# Patient Record
Sex: FEMALE | Race: BLACK OR AFRICAN AMERICAN | Employment: OTHER | URBAN - METROPOLITAN AREA
[De-identification: names, ages, dates, MRNs, and addresses within clinical notes are randomized per-mention and may not be internally consistent; named-entity substitution may affect disease eponyms.]

---

## 2017-04-27 ENCOUNTER — OFFICE VISIT (OUTPATIENT)
Dept: INTERNAL MEDICINE | Age: 82
End: 2017-04-27

## 2017-04-27 VITALS
SYSTOLIC BLOOD PRESSURE: 142 MMHG | OXYGEN SATURATION: 97 % | RESPIRATION RATE: 12 BRPM | DIASTOLIC BLOOD PRESSURE: 76 MMHG | WEIGHT: 167 LBS | HEART RATE: 48 BPM | BODY MASS INDEX: 28.67 KG/M2

## 2017-04-27 DIAGNOSIS — N18.30 CHRONIC KIDNEY DISEASE, STAGE III (MODERATE) (HCC): Chronic | ICD-10-CM

## 2017-04-27 DIAGNOSIS — R00.1 BRADYCARDIA: Chronic | ICD-10-CM

## 2017-04-27 DIAGNOSIS — K62.1 RECTAL POLYP: Chronic | ICD-10-CM

## 2017-04-27 DIAGNOSIS — K63.5 COLON POLYPS: Chronic | ICD-10-CM

## 2017-04-27 DIAGNOSIS — I10 ESSENTIAL HYPERTENSION: Primary | Chronic | ICD-10-CM

## 2017-04-27 PROCEDURE — 99214 OFFICE O/P EST MOD 30 MIN: CPT | Performed by: INTERNAL MEDICINE

## 2017-04-27 PROCEDURE — 4040F PNEUMOC VAC/ADMIN/RCVD: CPT | Performed by: INTERNAL MEDICINE

## 2017-04-27 PROCEDURE — G8427 DOCREV CUR MEDS BY ELIG CLIN: HCPCS | Performed by: INTERNAL MEDICINE

## 2017-04-27 PROCEDURE — G8399 PT W/DXA RESULTS DOCUMENT: HCPCS | Performed by: INTERNAL MEDICINE

## 2017-04-27 PROCEDURE — 1036F TOBACCO NON-USER: CPT | Performed by: INTERNAL MEDICINE

## 2017-04-27 PROCEDURE — 1123F ACP DISCUSS/DSCN MKR DOCD: CPT | Performed by: INTERNAL MEDICINE

## 2017-04-27 PROCEDURE — 1090F PRES/ABSN URINE INCON ASSESS: CPT | Performed by: INTERNAL MEDICINE

## 2017-04-27 PROCEDURE — G8420 CALC BMI NORM PARAMETERS: HCPCS | Performed by: INTERNAL MEDICINE

## 2017-04-27 RX ORDER — AMLODIPINE BESYLATE 5 MG/1
5 TABLET ORAL DAILY
Qty: 90 TABLET | Refills: 3 | Status: SHIPPED | OUTPATIENT
Start: 2017-04-27 | End: 2017-07-18 | Stop reason: SDUPTHER

## 2017-04-27 RX ORDER — TRIAMTERENE AND HYDROCHLOROTHIAZIDE 75; 50 MG/1; MG/1
1 TABLET ORAL DAILY
Qty: 90 TABLET | Refills: 3 | Status: SHIPPED | OUTPATIENT
Start: 2017-04-27 | End: 2017-07-18 | Stop reason: SDUPTHER

## 2017-04-27 ASSESSMENT — ENCOUNTER SYMPTOMS
GASTROINTESTINAL NEGATIVE: 1
RESPIRATORY NEGATIVE: 1
EYES NEGATIVE: 1

## 2019-09-02 ENCOUNTER — APPOINTMENT (OUTPATIENT)
Dept: GENERAL RADIOLOGY | Age: 84
End: 2019-09-02
Payer: MEDICARE

## 2019-09-02 ENCOUNTER — HOSPITAL ENCOUNTER (EMERGENCY)
Age: 84
Discharge: HOME | End: 2019-09-03
Attending: EMERGENCY MEDICINE
Payer: MEDICARE

## 2019-09-02 VITALS
HEART RATE: 85 BPM | SYSTOLIC BLOOD PRESSURE: 122 MMHG | OXYGEN SATURATION: 98 % | TEMPERATURE: 97.5 F | WEIGHT: 167 LBS | BODY MASS INDEX: 28.67 KG/M2 | RESPIRATION RATE: 16 BRPM | DIASTOLIC BLOOD PRESSURE: 53 MMHG

## 2019-09-02 DIAGNOSIS — T85.528A DISLODGED GASTROSTOMY TUBE: Primary | ICD-10-CM

## 2019-09-02 PROCEDURE — 99284 EMERGENCY DEPT VISIT MOD MDM: CPT

## 2019-09-02 PROCEDURE — 4500000024 HC ED LEVEL 4 PROCEDURE

## 2019-09-02 PROCEDURE — 74018 RADEX ABDOMEN 1 VIEW: CPT

## 2019-09-03 NOTE — ED NOTES
LPN from [facility] called requesting paperwork.  ED Encounter printed and faxed to [phone]     Francisco Marroquin RN  09/03/19 0006

## 2019-09-03 NOTE — ED PROVIDER NOTES
her father; Heart Attack in her brother and sister; Heart Defect in her sister; Heart Disease in her brother and sister; High Blood Pressure in her daughter and sister; Hypertension in her sister; No Known Problems in her son; Other in her brother, daughter, and sister; Stroke in her father. She reports that she has quit smoking. She has never used smokeless tobacco. She reports that she does not drink alcohol or use drugs. Medications     Discharge Medication List as of 9/3/2019 12:16 AM      CONTINUE these medications which have NOT CHANGED    Details   triamterene-hydrochlorothiazide (MAXZIDE) 75-50 MG per tablet Take 1 tablet by mouth daily, Disp-90 tablet, R-3Normal      amLODIPine (NORVASC) 5 MG tablet Take 1 tablet by mouth daily, Disp-90 tablet, R-3Normal      aspirin EC 81 MG EC tablet Take 1 tablet by mouth daily. With food. vitamin D (CHOLECALCIFEROL) 1000 UNIT TABS tablet Take 1 tablet by mouth daily. vitamin B-12 (CYANOCOBALAMIN) 500 MCG tablet Take 1 tablet by mouth daily. Ascorbic Acid (VITAMIN C) 500 MG tablet Take 1 tablet by mouth daily. calcium carbonate (OSCAL) 500 MG TABS tablet Take 1 tablet by mouth daily. With food. loratadine (CLARITIN) 10 MG tablet Take 1 tablet by mouth daily. , Disp-90 tablet, R-3      Multiple Vitamin (MULTIVITAMIN PO) Take 1 tablet by mouth daily. Allergies     She has No Known Allergies. Physical Exam     INITIAL VITALS: BP: (!) 122/53, Temp: 97.5 °F (36.4 °C), Pulse: 85, Resp: 16, SpO2: 98 %     Physical exam  General: well-appearing, no acute distress. HEENT: no discharge from the eyes or nose   Cardiovascular: regular rate and rhythm. Pulmonary: non-labored breathing, normal lung sounds  Abdominal: soft, non-tender, non-distended. There is an ostomy site in the LUQ with mild surrounding erythema and local trauma without evidence of active bleeding.    Musculoskeletal: no long bone deformity  Extremities: no peripheral

## 2022-02-15 ENCOUNTER — ANESTHESIA EVENT (OUTPATIENT)
Dept: ENDOSCOPY | Age: 87
End: 2022-02-15
Payer: MEDICARE

## 2022-02-15 ENCOUNTER — ANESTHESIA (OUTPATIENT)
Dept: ENDOSCOPY | Age: 87
End: 2022-02-15
Payer: MEDICARE

## 2022-02-15 ENCOUNTER — HOSPITAL ENCOUNTER (OUTPATIENT)
Age: 87
Setting detail: OBSERVATION
Discharge: NURSING, REHAB OR LONG-TERM CARE | End: 2022-02-16
Attending: EMERGENCY MEDICINE | Admitting: INTERNAL MEDICINE
Payer: MEDICARE

## 2022-02-15 VITALS — DIASTOLIC BLOOD PRESSURE: 59 MMHG | OXYGEN SATURATION: 99 % | SYSTOLIC BLOOD PRESSURE: 120 MMHG

## 2022-02-15 DIAGNOSIS — I10 ESSENTIAL HYPERTENSION: Chronic | ICD-10-CM

## 2022-02-15 DIAGNOSIS — I10 HYPERTENSION: Chronic | ICD-10-CM

## 2022-02-15 DIAGNOSIS — T85.528A GASTROJEJUNOSTOMY TUBE DISLODGEMENT: Primary | ICD-10-CM

## 2022-02-15 PROBLEM — K94.23 GASTROSTOMY MALFUNCTION (HCC): Status: ACTIVE | Noted: 2022-02-15

## 2022-02-15 LAB
ANION GAP SERPL CALCULATED.3IONS-SCNC: 8 MMOL/L (ref 3–16)
BASOPHILS ABSOLUTE: 0 K/UL (ref 0–0.2)
BASOPHILS RELATIVE PERCENT: 0.9 %
BUN BLDV-MCNC: 13 MG/DL (ref 7–20)
CALCIUM SERPL-MCNC: 10.1 MG/DL (ref 8.3–10.6)
CHLORIDE BLD-SCNC: 105 MMOL/L (ref 99–110)
CO2: 30 MMOL/L (ref 21–32)
CREAT SERPL-MCNC: 0.6 MG/DL (ref 0.6–1.2)
EOSINOPHILS ABSOLUTE: 0.2 K/UL (ref 0–0.6)
EOSINOPHILS RELATIVE PERCENT: 4.2 %
GFR AFRICAN AMERICAN: >60
GFR NON-AFRICAN AMERICAN: >60
GLUCOSE BLD-MCNC: 117 MG/DL (ref 70–99)
GLUCOSE BLD-MCNC: 80 MG/DL (ref 70–99)
GLUCOSE BLD-MCNC: 87 MG/DL (ref 70–99)
HCT VFR BLD CALC: 40.1 % (ref 36–48)
HEMOGLOBIN: 13.2 G/DL (ref 12–16)
LYMPHOCYTES ABSOLUTE: 1.6 K/UL (ref 1–5.1)
LYMPHOCYTES RELATIVE PERCENT: 29.6 %
MCH RBC QN AUTO: 31.1 PG (ref 26–34)
MCHC RBC AUTO-ENTMCNC: 33 G/DL (ref 31–36)
MCV RBC AUTO: 94.2 FL (ref 80–100)
MONOCYTES ABSOLUTE: 0.4 K/UL (ref 0–1.3)
MONOCYTES RELATIVE PERCENT: 7.5 %
NEUTROPHILS ABSOLUTE: 3 K/UL (ref 1.7–7.7)
NEUTROPHILS RELATIVE PERCENT: 57.8 %
PDW BLD-RTO: 14.5 % (ref 12.4–15.4)
PERFORMED ON: ABNORMAL
PERFORMED ON: NORMAL
PLATELET # BLD: 206 K/UL (ref 135–450)
PMV BLD AUTO: 11 FL (ref 5–10.5)
POTASSIUM REFLEX MAGNESIUM: 4.3 MMOL/L (ref 3.5–5.1)
RBC # BLD: 4.26 M/UL (ref 4–5.2)
SARS-COV-2, NAAT: NOT DETECTED
SODIUM BLD-SCNC: 143 MMOL/L (ref 136–145)
WBC # BLD: 5.3 K/UL (ref 4–11)

## 2022-02-15 PROCEDURE — 6360000002 HC RX W HCPCS: Performed by: NURSE ANESTHETIST, CERTIFIED REGISTERED

## 2022-02-15 PROCEDURE — 2709999900 HC NON-CHARGEABLE SUPPLY: Performed by: INTERNAL MEDICINE

## 2022-02-15 PROCEDURE — 80048 BASIC METABOLIC PNL TOTAL CA: CPT

## 2022-02-15 PROCEDURE — 7100000001 HC PACU RECOVERY - ADDTL 15 MIN: Performed by: INTERNAL MEDICINE

## 2022-02-15 PROCEDURE — 2500000003 HC RX 250 WO HCPCS: Performed by: NURSE ANESTHETIST, CERTIFIED REGISTERED

## 2022-02-15 PROCEDURE — 2580000003 HC RX 258: Performed by: INTERNAL MEDICINE

## 2022-02-15 PROCEDURE — 87635 SARS-COV-2 COVID-19 AMP PRB: CPT

## 2022-02-15 PROCEDURE — 3609013300 HC EGD TUBE PLACEMENT: Performed by: INTERNAL MEDICINE

## 2022-02-15 PROCEDURE — G0378 HOSPITAL OBSERVATION PER HR: HCPCS

## 2022-02-15 PROCEDURE — 3700000001 HC ADD 15 MINUTES (ANESTHESIA): Performed by: INTERNAL MEDICINE

## 2022-02-15 PROCEDURE — 85025 COMPLETE CBC W/AUTO DIFF WBC: CPT

## 2022-02-15 PROCEDURE — 3700000000 HC ANESTHESIA ATTENDED CARE: Performed by: INTERNAL MEDICINE

## 2022-02-15 PROCEDURE — 99285 EMERGENCY DEPT VISIT HI MDM: CPT

## 2022-02-15 PROCEDURE — 2580000003 HC RX 258: Performed by: PHYSICIAN ASSISTANT

## 2022-02-15 PROCEDURE — 7100000000 HC PACU RECOVERY - FIRST 15 MIN: Performed by: INTERNAL MEDICINE

## 2022-02-15 RX ORDER — DEXTROSE MONOHYDRATE 25 G/50ML
12.5 INJECTION, SOLUTION INTRAVENOUS ONCE
Status: CANCELLED | OUTPATIENT
Start: 2022-02-15

## 2022-02-15 RX ORDER — SODIUM CHLORIDE 9 MG/ML
25 INJECTION, SOLUTION INTRAVENOUS PRN
Status: DISCONTINUED | OUTPATIENT
Start: 2022-02-15 | End: 2022-02-16 | Stop reason: HOSPADM

## 2022-02-15 RX ORDER — ONDANSETRON 8 MG/1
4 TABLET, ORALLY DISINTEGRATING ORAL EVERY 6 HOURS PRN
COMMUNITY

## 2022-02-15 RX ORDER — GLYCOPYRROLATE 1 MG/5 ML
SYRINGE (ML) INTRAVENOUS PRN
Status: DISCONTINUED | OUTPATIENT
Start: 2022-02-15 | End: 2022-02-15 | Stop reason: SDUPTHER

## 2022-02-15 RX ORDER — PROPOFOL 10 MG/ML
INJECTION, EMULSION INTRAVENOUS CONTINUOUS PRN
Status: DISCONTINUED | OUTPATIENT
Start: 2022-02-15 | End: 2022-02-15 | Stop reason: SDUPTHER

## 2022-02-15 RX ORDER — ACETAMINOPHEN 325 MG/1
650 TABLET ORAL EVERY 4 HOURS PRN
Status: DISCONTINUED | OUTPATIENT
Start: 2022-02-15 | End: 2022-02-16 | Stop reason: HOSPADM

## 2022-02-15 RX ORDER — SODIUM CHLORIDE 0.9 % (FLUSH) 0.9 %
5-40 SYRINGE (ML) INJECTION PRN
Status: DISCONTINUED | OUTPATIENT
Start: 2022-02-15 | End: 2022-02-16 | Stop reason: HOSPADM

## 2022-02-15 RX ORDER — LORAZEPAM 2 MG/ML
1 INJECTION INTRAMUSCULAR EVERY 6 HOURS PRN
Status: ON HOLD | COMMUNITY
End: 2022-02-16 | Stop reason: HOSPADM

## 2022-02-15 RX ORDER — MORPHINE SULFATE 5 MG/ML
INJECTION, SOLUTION INTRAMUSCULAR; INTRAVENOUS
Status: ON HOLD | COMMUNITY
End: 2022-02-16 | Stop reason: HOSPADM

## 2022-02-15 RX ORDER — ASPIRIN 81 MG/1
81 TABLET ORAL DAILY
Status: DISCONTINUED | OUTPATIENT
Start: 2022-02-15 | End: 2022-02-15

## 2022-02-15 RX ORDER — CALCIUM CARBONATE 500(1250)
500 TABLET ORAL DAILY
Status: DISCONTINUED | OUTPATIENT
Start: 2022-02-15 | End: 2022-02-15

## 2022-02-15 RX ORDER — LANOLIN ALCOHOL/MO/W.PET/CERES
500 CREAM (GRAM) TOPICAL DAILY
Status: DISCONTINUED | OUTPATIENT
Start: 2022-02-15 | End: 2022-02-15

## 2022-02-15 RX ORDER — VITAMIN B COMPLEX
1000 TABLET ORAL DAILY
Status: DISCONTINUED | OUTPATIENT
Start: 2022-02-15 | End: 2022-02-15

## 2022-02-15 RX ORDER — MORPHINE SULFATE 100 MG/5ML
10 SOLUTION ORAL
Status: ON HOLD | COMMUNITY
End: 2022-02-16 | Stop reason: HOSPADM

## 2022-02-15 RX ORDER — MULTIVIT-MIN/FERROUS GLUCONATE 9 MG/15 ML
15 LIQUID (ML) ORAL DAILY
Status: DISCONTINUED | OUTPATIENT
Start: 2022-02-15 | End: 2022-02-15

## 2022-02-15 RX ORDER — MULTIVITAMIN WITH IRON
1 TABLET ORAL DAILY
Status: DISCONTINUED | OUTPATIENT
Start: 2022-02-15 | End: 2022-02-15 | Stop reason: SDUPTHER

## 2022-02-15 RX ORDER — TRIAMTERENE AND HYDROCHLOROTHIAZIDE 75; 50 MG/1; MG/1
1 TABLET ORAL DAILY
Status: DISCONTINUED | OUTPATIENT
Start: 2022-02-15 | End: 2022-02-15

## 2022-02-15 RX ORDER — POTASSIUM CHLORIDE 20 MEQ/1
20 TABLET, EXTENDED RELEASE ORAL DAILY
Status: ON HOLD | COMMUNITY
End: 2022-02-16 | Stop reason: HOSPADM

## 2022-02-15 RX ORDER — MORPHINE SULFATE 100 MG/5ML
10 SOLUTION ORAL EVERY 8 HOURS
Status: ON HOLD | COMMUNITY
End: 2022-02-16 | Stop reason: HOSPADM

## 2022-02-15 RX ORDER — PROPOFOL 10 MG/ML
INJECTION, EMULSION INTRAVENOUS PRN
Status: DISCONTINUED | OUTPATIENT
Start: 2022-02-15 | End: 2022-02-15 | Stop reason: SDUPTHER

## 2022-02-15 RX ORDER — PHENYLEPHRINE HCL IN 0.9% NACL 1 MG/10 ML
SYRINGE (ML) INTRAVENOUS PRN
Status: DISCONTINUED | OUTPATIENT
Start: 2022-02-15 | End: 2022-02-15 | Stop reason: SDUPTHER

## 2022-02-15 RX ORDER — ASCORBIC ACID 500 MG
500 TABLET ORAL DAILY
Status: DISCONTINUED | OUTPATIENT
Start: 2022-02-15 | End: 2022-02-15

## 2022-02-15 RX ORDER — SODIUM CHLORIDE 0.9 % (FLUSH) 0.9 %
5-40 SYRINGE (ML) INJECTION EVERY 12 HOURS SCHEDULED
Status: DISCONTINUED | OUTPATIENT
Start: 2022-02-15 | End: 2022-02-16 | Stop reason: HOSPADM

## 2022-02-15 RX ORDER — CETIRIZINE HYDROCHLORIDE 10 MG/1
5 TABLET ORAL DAILY
Refills: 3 | Status: DISCONTINUED | OUTPATIENT
Start: 2022-02-15 | End: 2022-02-15

## 2022-02-15 RX ORDER — LEVETIRACETAM 100 MG/ML
10 SOLUTION ORAL 2 TIMES DAILY
COMMUNITY

## 2022-02-15 RX ORDER — CEFAZOLIN SODIUM 1 G/3ML
INJECTION, POWDER, FOR SOLUTION INTRAMUSCULAR; INTRAVENOUS PRN
Status: DISCONTINUED | OUTPATIENT
Start: 2022-02-15 | End: 2022-02-15 | Stop reason: SDUPTHER

## 2022-02-15 RX ORDER — LANSOPRAZOLE 30 MG/1
30 TABLET, ORALLY DISINTEGRATING, DELAYED RELEASE ORAL
Status: DISCONTINUED | OUTPATIENT
Start: 2022-02-16 | End: 2022-02-16 | Stop reason: HOSPADM

## 2022-02-15 RX ADMIN — SODIUM CHLORIDE: 9 INJECTION, SOLUTION INTRAVENOUS at 13:03

## 2022-02-15 RX ADMIN — Medication 0.2 MG: at 13:31

## 2022-02-15 RX ADMIN — Medication 200 MCG: at 13:30

## 2022-02-15 RX ADMIN — PROPOFOL 40 MG: 10 INJECTION, EMULSION INTRAVENOUS at 13:11

## 2022-02-15 RX ADMIN — PROPOFOL 30 MG: 10 INJECTION, EMULSION INTRAVENOUS at 13:08

## 2022-02-15 RX ADMIN — Medication 100 MCG: at 13:15

## 2022-02-15 RX ADMIN — PROPOFOL 30 MG: 10 INJECTION, EMULSION INTRAVENOUS at 13:07

## 2022-02-15 RX ADMIN — SODIUM CHLORIDE, PRESERVATIVE FREE 10 ML: 5 INJECTION INTRAVENOUS at 21:00

## 2022-02-15 RX ADMIN — Medication 100 MCG: at 13:22

## 2022-02-15 RX ADMIN — PROPOFOL 30 MG: 10 INJECTION, EMULSION INTRAVENOUS at 13:09

## 2022-02-15 RX ADMIN — PROPOFOL 100 MCG/KG/MIN: 10 INJECTION, EMULSION INTRAVENOUS at 13:07

## 2022-02-15 RX ADMIN — CEFAZOLIN 2000 MG: 1 INJECTION, POWDER, FOR SOLUTION INTRAVENOUS at 13:07

## 2022-02-15 RX ADMIN — Medication 0.2 MG: at 13:16

## 2022-02-15 ASSESSMENT — PULMONARY FUNCTION TESTS
PIF_VALUE: 1

## 2022-02-15 NOTE — ED NOTES
Priya from  called and stated she spoke with Yesenia too and got consent,  Told to send antibiotics with patient     Nena Ospina, NEMO  02/15/22 8581

## 2022-02-15 NOTE — PROGRESS NOTES
Pt arrived from endo to PACU bay 4. Report received from endo staff. Pt asleep, non-responsive at time of arrival; pt hypotensive at time of arrival, CRNA aware; Pt arrives on 4L oxygen via nasal cannula. Assisted endo RN to place abdominal binder on pt. Surgical incisions dressings in place to abdomen, pt PEG tube replaced. Pt to be admitted following procedure.

## 2022-02-15 NOTE — ED TRIAGE NOTES
Pt BIB EMS from NH, per report Pt pulled g-tube. No other distress noted. Pt non-verbal, hemiplegic at baseline. No bleeding/drainage noted from g-tube insertion site.

## 2022-02-15 NOTE — PROGRESS NOTES
Pt stable and able to be transferred from PACU to room 476. A&O , VSS, with no complaints at this time. 4T called and notified that pt is being transferred out of PACU and to room.

## 2022-02-15 NOTE — ANESTHESIA PRE PROCEDURE
Department of Anesthesiology  Preprocedure Note       Name:  Zulma Hewitt   Age:  80 y.o.  :  1935                                          MRN:  [id number]         Date:  2/15/2022      Surgeon: Loreto Rodriguez):  Sabas Gonzalez MD    Procedure: Procedure(s):  EGD PEG TUBE PLACEMENT    Medications prior to admission:   Prior to Admission medications    Medication Sig Start Date End Date Taking? Authorizing Provider   triamterene-hydrochlorothiazide (MAXZIDE) 75-50 MG per tablet Take 1 tablet by mouth daily 17   Amina Millan MD   amLODIPine [facility]) 5 MG tablet Take 1 tablet by mouth daily 17   Amina Millan MD   aspirin EC 81 MG EC tablet Take 1 tablet by mouth daily. With food. 4/15/14   Amina Millan MD   vitamin D (CHOLECALCIFEROL) 1000 UNIT TABS tablet Take 1 tablet by mouth daily. 4/15/14   Amina Millan MD   vitamin B-12 (CYANOCOBALAMIN) 500 MCG tablet Take 1 tablet by mouth daily. 13   Amina Millan MD   Ascorbic Acid (VITAMIN C) 500 MG tablet Take 1 tablet by mouth daily. 13   Amina Millan MD   calcium carbonate (OSCAL) 500 MG TABS tablet Take 1 tablet by mouth daily. With food. 13   Amina Millan MD   loratadine (CLARITIN) 10 MG tablet Take 1 tablet by mouth daily. 13   Amina Millan MD   Multiple Vitamin (MULTIVITAMIN PO) Take 1 tablet by mouth daily.     Historical Provider, MD       Current medications:    Current Facility-Administered Medications   Medication Dose Route Frequency Provider Last Rate Last Admin    sodium chloride flush 0.9 % injection 5-40 mL  5-40 mL IntraVENous 2 times per day Edward Thayer PA-C        sodium chloride flush 0.9 % injection 5-40 mL  5-40 mL IntraVENous PRN Edward Thayer PA-C        0.9 % sodium chloride infusion  25 mL IntraVENous PRN Edward Thayer PA-C        ceFAZolin (ANCEF) 2000 mg in dextrose 5 % 50 mL IVPB  2,000 mg IntraVENous [facility]        ascorbic acid (VITAMIN C) tablet 500 mg  500 mg Oral Daily Daquan Daly MD        aspirin EC tablet 81 mg  81 mg Oral Daily Daquan Daly MD        calcium carbonate (OSCAL) tablet 500 mg  500 mg Oral Daily Daquan Daly MD        cetirizine (ZYRTEC) tablet 5 mg  5 mg Oral Daily Daquan Daly MD        Multivitamin LIQD 5 mL  5 mL Oral Daily Daquan Daly MD        triamterene-hydroCHLOROthiazide (MAXZIDE) 75-50 MG per tablet 1 tablet  1 tablet Oral Daily Daquan Daly MD        vitamin B-12 (CYANOCOBALAMIN) tablet 500 mcg  500 mcg Oral Daily Daquan Daly MD        vitamin D (CHOLECALCIFEROL) tablet 1,000 Units  1,000 Units Oral Daily Daquan Daly MD        acetaminophen (TYLENOL) tablet 650 mg  650 mg Oral Q4H PRN Daquan Daly MD           Allergies:  No Known Allergies    Problem List:    Patient Active Problem List   Diagnosis Code    Restless legs syndrome (RLS) G25.81    Menopause syndrome N95.1    Chronic kidney disease, stage III (moderate) (HCC) N18.30    Allergic rhinitis J30.9    Hemorrhoids K64.9    Osteopenia M85.80    Cataract H26.9    Rectal polyp K62.1    Diverticulosis K57.90    Fibrocystic breast disease N60.19    Osteoporosis M81.0    Bradycardia R00.1    Shingles B02.9    Insomnia G47.00    Peripheral neuropathy G62.9    Anxiety F41.9    Heme positive stool R19.5    Colon polyps K63.5    Essential hypertension I10    Primary osteoarthritis M19.91    Gastrostomy malfunction ([address] K94.23       Past Medical History:        Diagnosis Date    Allergic rhinitis     Anxiety 9/23/2013    Arthritis     Bradycardia 9/12/2011    Cataract     Chronic kidney disease, stage III (moderate)     Colon polyps 10/8/2015    Diverticulosis     Essential hypertension 10/8/2015    Fibrocystic breast disease     Hemorrhoids     Hypertension     Insomnia 9/23/2013    Menopause syndrome     Osteoarthritis     Osteopenia     Osteoporosis 9/12/2011    Other nonspecific finding on examination of urine     [facility] 9/23/2013    Primary osteoarthritis 10/8/2015    Rectal polyp     Restless legs syndrome (RLS)     Shingles 3/12/2012       Past Surgical History:        Procedure Laterality Date    COLONOSCOPY      COLONOSCOPY  May 5, 2015    Dr. Ligia Vásquez  01/10/2017    Dr. Sacha Greene       Social History:    Social History     Tobacco Use    Smoking status: Former Smoker    Smokeless tobacco: Never Used    Tobacco comment: quit in the 1970s   Substance Use Topics    Alcohol use:  No                                Counseling given: Not Answered  Comment: quit in the 1970s      Vital Signs (Current):   Vitals:    02/15/22 0801 02/15/22 0948 02/15/22 1018 02/15/22 1103   BP: 121/84 (!) 172/120 (!) 168/90 (!) 148/105   Pulse: 59 64 79 83   Resp: 11 12 14 11   Temp:       TempSrc:       SpO2: 96% 98% 97% 98%                                              BP Readings from Last 3 Encounters:   02/15/22 (!) 148/105   09/02/19 (!) 122/53   04/27/17 (!) 142/76       NPO Status:                                                                                 BMI:   Wt Readings from Last 3 Encounters:   09/02/19 167 lb (75.8 kg)   04/27/17 167 lb (75.8 kg)   10/27/16 168 lb (76.2 kg)     There is no height or weight on file to calculate BMI.    CBC:   Lab Results   Component Value Date    WBC 5.3 02/15/2022    RBC 4.26 02/15/2022    HGB 13.2 02/15/2022    HCT 40.1 02/15/2022    MCV 94.2 02/15/2022    RDW 14.5 02/15/2022     02/15/2022       CMP:   Lab Results   Component Value Date     02/15/2022    K 4.3 02/15/2022     02/15/2022    CO2 30 02/15/2022    BUN 13 02/15/2022    CREATININE 0.6 02/15/2022    GFRAA >60 02/15/2022    GFRAA >60 09/19/2012    LABGLOM >60 02/15/2022    GLUCOSE 87 02/15/2022    GLUCOSE 87 09/12/2011    PROT 7.3 10/27/2016    PROT 7.1 09/19/2012    CALCIUM 10.1 02/15/2022    BILITOT 0.4 10/27/2016    ALKPHOS 71 10/27/2016    AST 18 10/27/2016    ALT 14 10/27/2016       POC Tests: No results for input(s): POCGLU, POCNA, POCK, POCCL, POCBUN, POCHEMO, POCHCT in the last 72 hours. Coags: No results found for: PROTIME, INR, APTT    HCG (If Applicable): No results found for: PREGTESTUR, PREGSERUM, HCG, HCGQUANT     ABGs: No results found for: PHART, PO2ART, ZYS0MTF, DRQ4IUJ, BEART, B4FQYSLD     Type & Screen (If Applicable):  No results found for: LABABO, LABRH    Drug/Infectious Status (If Applicable):  No results found for: HIV, HEPCAB    COVID-19 Screening (If Applicable):   Lab Results   Component Value Date    COVID19 Not Detected 02/15/2022           Anesthesia Evaluation  Patient summary reviewed and Nursing notes reviewed  Airway: Mallampati: Unable to assess / NA        Dental:          Pulmonary:                              Cardiovascular:  Exercise tolerance: poor (<4 METS),   (+) hypertension:, hyperlipidemia                  Neuro/Psych:   (+) seizures:, neuromuscular disease:,             GI/Hepatic/Renal:   (+) renal disease:,           Endo/Other:                     Abdominal:             Vascular:           Other Findings:             Anesthesia Plan      MAC and general     ASA 3 - emergent                                 Emani Sherman MD   2/15/2022

## 2022-02-15 NOTE — ANESTHESIA POSTPROCEDURE EVALUATION
Department of Anesthesiology  Postprocedure Note    Patient: Nina Cardenas  MRN: [id number]  YOB: 1935  Date of evaluation: 2/15/2022  Time:  2:22 PM     Procedure Summary     Date: 02/15/22 Room / Location: [address] [place] [address]    Anesthesia Start: 7611 Anesthesia Stop: 3634    Procedure: EGD PEG TUBE PLACEMENT (N/A Abdomen) Diagnosis: (peg tube malfunction)    Surgeons: Og Eng MD Responsible Provider: Army Deejay MD    Anesthesia Type: MAC, general ASA Status: 3 - Emergent          Anesthesia Type: MAC, general    Brandi Phase I: Brandi Score: 5    Brandi Phase II:      Last vitals: Reviewed and per EMR flowsheets.        Anesthesia Post Evaluation    Patient location during evaluation: PACU  Patient participation: complete - patient participated  Level of consciousness: awake  Airway patency: patent  Nausea & Vomiting: no vomiting  Complications: no  Cardiovascular status: hemodynamically stable  Respiratory status: acceptable  Hydration status: euvolemic  Multimodal analgesia pain management approach

## 2022-02-15 NOTE — ED PROVIDER NOTES
[address] [facility] PROVIDER NOTE    Patient Identification  Pt Name: Kroi Kimball  MRN: [id number]  Ash 1935  Date of evaluation: 2/15/2022  Provider: Saundra Encarnacion MD  PCP: Emily Velasquez MD    Chief Complaint  G Tube Complications (Pt BIB EMS from NH, per report Pt pulled g-tube. No other distress noted. Pt non-verbal, hemiplegic at baseline. No bleeding/drainage noted from g-tube insertion site. )      HPI  History provided by EMS  This is a 80 y.o. female who presents to the ED for G-tube dislodgment. Nursing staff unsure when it was pulled, likely overnight. Patient unable to give any history. Jaswant Case ROS  12 systems reviewed, pertinent positives/negatives per HPI otherwise noted to be negative. I have reviewed the following nursing documentation:  Allergies: Patient has no known allergies. Past medical history:   Past Medical History:   Diagnosis Date    Allergic rhinitis     Anxiety 9/23/2013    Arthritis     Bradycardia 9/12/2011    Cataract     Chronic kidney disease, stage III (moderate)     Colon polyps 10/8/2015    Diverticulosis     Essential hypertension 10/8/2015    Fibrocystic breast disease     Hemorrhoids     Hypertension     Insomnia 9/23/2013    Menopause syndrome     Osteoarthritis     Osteopenia     Osteoporosis 9/12/2011    Other nonspecific finding on examination of urine     Peripheral neuropathy ([facility] 9/23/2013    Primary osteoarthritis 10/8/2015    Rectal polyp     Restless legs syndrome (RLS)     Shingles 3/12/2012     Past surgical history:   Past Surgical History:   Procedure Laterality Date    COLONOSCOPY      COLONOSCOPY  May 5, 2015    Dr. Shell Gipson COLONOSCOPY  01/10/2017    Dr. Moore Saliva medications:   Previous Medications    AMLODIPINE (NORVASC) 5 MG TABLET    Take 1 tablet by mouth daily    ASCORBIC ACID (VITAMIN C) 500 MG TABLET    Take 1 tablet by mouth daily. ASPIRIN EC 81 MG EC TABLET    Take 1 tablet by mouth daily. With food. CALCIUM CARBONATE (OSCAL) 500 MG TABS TABLET    Take 1 tablet by mouth daily. With food. LORATADINE (CLARITIN) 10 MG TABLET    Take 1 tablet by mouth daily. MULTIPLE VITAMIN (MULTIVITAMIN PO)    Take 1 tablet by mouth daily. TRIAMTERENE-HYDROCHLOROTHIAZIDE (MAXZIDE) 75-50 MG PER TABLET    Take 1 tablet by mouth daily    VITAMIN B-12 (CYANOCOBALAMIN) 500 MCG TABLET    Take 1 tablet by mouth daily. VITAMIN D (CHOLECALCIFEROL) 1000 UNIT TABS TABLET    Take 1 tablet by mouth daily. Social history:  reports that she has quit smoking. She has never used smokeless tobacco. She reports that she does not drink alcohol and does not use drugs. Family history:    Family History   Problem Relation Age of Onset    Other Brother         hit by a train - 1980s - required head surgery    Heart Disease Brother     Heart Attack Brother     Cancer Mother         cervical cancer    Cancer Father         prostate cancer    Blindness Father     Glaucoma Father     Stroke Father     Heart Disease Sister     Heart Attack Sister     Coronary Art Dis Sister     Heart Defect Sister     High Blood Pressure Sister     Hypertension Sister     Other Sister         blood clot in the eye, below the knee amputation due to an infection    No Known Problems Son     Other Daughter         partial hysterectomy due to menorrhagia    High Blood Pressure Daughter          Exam  ED Triage Vitals   BP Temp Temp Source Pulse Resp SpO2 Height Weight   02/15/22 0527 02/15/22 0530 02/15/22 0530 02/15/22 0527 02/15/22 0527 02/15/22 0527 -- --   (!) 155/88 98.4 °F (36.9 °C) Axillary 80 20 98 %       Nursing note and vitals reviewed. Constitutional: In no acute distress  HENT:      Head: Normocephalic      Ears: External ears normal.      Nose: Nose normal.     Mouth: Membrane mucosa moist   Eyes: No discharge. Neck: Supple. Trachea midline. replacement with 20 [employer] [place] which was what patient originally had however was unsuccessful. I attempted using a smaller size however was unsuccessful again. Is unclear how long the G-tube had been dislodged for. Will discuss with general surgery for possible dilation. Spoke with Dr. Migdalia Hodge gastroenterology who recommended patient be admitted if unable to pass catheter. [unfilled]    Final Impression  1. Gastrojejunostomy tube dislodgement        Blood pressure 121/84, pulse 59, temperature 98.4 °F (36.9 °C), temperature source Axillary, resp. rate 11, SpO2 96 %, not currently breastfeeding. Disposition:  DISPOSITION        Patient Referrals:  No follow-up provider specified. Discharge Medications:  New Prescriptions    No medications on file       Discontinued Medications:  Discontinued Medications    No medications on file       This chart was generated using the [employer] dictation system. I created this record but it may contain dictation errors given the limitations of this technology.         Charlotte Butt MD  02/15/22 7880

## 2022-02-15 NOTE — PROGRESS NOTES
Pt transferred to room 4476 at this time. A&O with no signs of distress. Report given to [facility]. V/u and denies questions or further needs at this time.

## 2022-02-15 NOTE — ED NOTES
Spoke with daughter Jeison Delay [phone] about verbal consent for endoscopy to replace g tube. Verbal permission given.   Spoke with Ina in endo and gave report will be down to get patient     Lissette Alexander RN  02/15/22 9708

## 2022-02-15 NOTE — PROGRESS NOTES
Pt on RA, baseline mentation, VSS. Pt meets criteria to be discharged from Phase 1, but waiting for a room assignment at this time. Will continue to monitor.

## 2022-02-15 NOTE — PROGRESS NOTES
Report taken from [address] [place] [address] at this time. Pt denies needs at this time. Will continue to monitor.

## 2022-02-15 NOTE — CONSULTS
(CHOLECALCIFEROL) 1000 UNIT TABS tablet Take 1 tablet by mouth daily.  vitamin B-12 (CYANOCOBALAMIN) 500 MCG tablet Take 1 tablet by mouth daily.  Ascorbic Acid (VITAMIN C) 500 MG tablet Take 1 tablet by mouth daily.  calcium carbonate (OSCAL) 500 MG TABS tablet Take 1 tablet by mouth daily. With food.  loratadine (CLARITIN) 10 MG tablet Take 1 tablet by mouth daily. 90 tablet 3    Multiple Vitamin (MULTIVITAMIN PO) Take 1 tablet by mouth daily. Allergies  No Known Allergies   Social   Social History     Tobacco Use    Smoking status: Former Smoker    Smokeless tobacco: Never Used    Tobacco comment: quit in the 1970s   Substance Use Topics    Alcohol use: No        Family History   Problem Relation Age of Onset    Other Brother         hit by a train - 1980s - required head surgery    Heart Disease Brother     Heart Attack Brother     Cancer Mother         cervical cancer    Cancer Father         prostate cancer    Blindness Father     Glaucoma Father     Stroke Father     Heart Disease Sister     Heart Attack Sister     Coronary Art Dis Sister     Heart Defect Sister     High Blood Pressure Sister     Hypertension Sister     Other Sister         blood clot in the eye, below the knee amputation due to an infection    No Known Problems Son     Other Daughter         partial hysterectomy due to menorrhagia    High Blood Pressure Daughter           Review of Systems  Review of systems not obtained due to patient factors. Physical Exam  Blood pressure 121/84, pulse 59, temperature 98.4 °F (36.9 °C), temperature source Axillary, resp. rate 11, SpO2 96 %, not currently breastfeeding.     General appearance: Awakens, nonverbal, no distress, appears stated age  Eyes: Anicteric  Head: Normocephalic, without obvious abnormality  Lungs: clear to auscultation bilaterally, Normal Effort  Heart: regular rate and rhythm, normal S1 and S2, no murmurs or rubs  Abdomen: soft, non-tender. Bowel sounds normal. No masses,  no organomegaly. Gastrocutaneous site in epigastrium  Extremities: atraumatic, no cyanosis or edema  Skin: warm and dry, no jaundice  Neuro: Nonverbal  Musculoskeletal: no muscle wasting      Data Review:    Recent Labs     02/15/22  0819   WBC 5.3   HGB 13.2   HCT 40.1   MCV 94.2        Recent Labs     02/15/22  0819      K 4.3      CO2 30   BUN 13   CREATININE 0.6     No results for input(s): AST, ALT, ALB, BILIDIR, BILITOT, ALKPHOS in the last 72 hours. No results for input(s): LIPASE, AMYLASE in the last 72 hours. No results for input(s): PROTIME, INR in the last 72 hours. No results for input(s): PTT in the last 72 hours. No results for input(s): OCCULTBLD in the last 72 hours. Assessment:     Active Problems:    * No active hospital problems. *  Resolved Problems:    * No resolved hospital problems. *    PEG tube malfunction -PEG tube fell out and was not able to be replaced in the ER at bedside. Old tube was 20 Fr. Recommendations:   -Plan EGD with PEG tube replacement today. Discussed with patient's daughter, Briseida Mclean, who is in agreement. Discussed with Dr. Levy Ospina PA-C  [facility]    I have personally performed a face to face diagnostic evaluation on this patient. I have interviewed and examined the patient and I agree with the findings and recommended plan of care. 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 Her PEG was accidentally pulled out at the nursing home last night. She presented to the ER earlier today. However, the ER physician was unable to replace the PEG at bedside (the PEG tract likely closed off). Patient is nonverbal.  Daughter provided consent for PEG tube replacement. Vital signs are stable.   Abdomen is soft nontender no rebound or guarding. + PEG site does not look infected  A/P> PEG tube malfunction. Keep n.p.o.  EGD with PEG placement later today. If possible, we will try to use the old PEG tract.      Carlie Faust MD, MSc  [facility]  02/15/22

## 2022-02-16 VITALS
BODY MASS INDEX: 28.67 KG/M2 | HEIGHT: 64 IN | DIASTOLIC BLOOD PRESSURE: 74 MMHG | OXYGEN SATURATION: 95 % | RESPIRATION RATE: 18 BRPM | HEART RATE: 79 BPM | TEMPERATURE: 97.4 F | SYSTOLIC BLOOD PRESSURE: 139 MMHG

## 2022-02-16 PROBLEM — K94.20 GASTROSTOMY COMPLICATION (HCC): Status: ACTIVE | Noted: 2022-02-16

## 2022-02-16 PROCEDURE — 6370000000 HC RX 637 (ALT 250 FOR IP): Performed by: INTERNAL MEDICINE

## 2022-02-16 PROCEDURE — G0378 HOSPITAL OBSERVATION PER HR: HCPCS

## 2022-02-16 RX ORDER — MULTIVITAMIN
1 TABLET ORAL DAILY
Qty: 30 TABLET | Refills: 5 | Status: SHIPPED | OUTPATIENT
Start: 2022-02-16

## 2022-02-16 RX ORDER — AMLODIPINE BESYLATE 5 MG/1
5 TABLET ORAL DAILY
Qty: 90 TABLET | Refills: 3 | Status: SHIPPED | OUTPATIENT
Start: 2022-02-16

## 2022-02-16 RX ORDER — LANSOPRAZOLE 30 MG/1
30 TABLET, ORALLY DISINTEGRATING, DELAYED RELEASE ORAL
Qty: 30 TABLET | Refills: 2 | Status: SHIPPED | OUTPATIENT
Start: 2022-02-17

## 2022-02-16 RX ORDER — CHOLECALCIFEROL (VITAMIN D3) 125 MCG
500 CAPSULE ORAL DAILY
Qty: 30 TABLET | Refills: 3 | Status: SHIPPED | OUTPATIENT
Start: 2022-02-16

## 2022-02-16 RX ORDER — LORATADINE 10 MG/1
10 TABLET ORAL DAILY
Qty: 90 TABLET | Refills: 3 | Status: SHIPPED | OUTPATIENT
Start: 2022-02-16

## 2022-02-16 RX ADMIN — LANSOPRAZOLE 30 MG: 30 TABLET, ORALLY DISINTEGRATING, DELAYED RELEASE ORAL at 07:50

## 2022-02-16 ASSESSMENT — PAIN SCALES - GENERAL
PAINLEVEL_OUTOF10: 0
PAINLEVEL_OUTOF10: 0

## 2022-02-16 NOTE — PROGRESS NOTES
Gastroenterology Progress Note      Hanna Borjas is a 80 y.o. female patient. 1. Gastrojejunostomy tube dislodgement    2. Hypertension    3. Essential hypertension        SUBJECTIVE:  Tube feeds were ordered this am and haven't been started yet. ROS:  Unable to obtain    Physical    VITALS:  /74   Pulse 79   Temp 97.4 °F (36.3 °C) (Axillary)   Resp 18   Ht 5' 4\" (1.626 m)   SpO2 95%   BMI 28.67 kg/m²   TEMPERATURE:  Current - Temp: 97.4 °F (36.3 °C); Max - Temp  Av.3 °F (36.3 °C)  Min: 96.9 °F (36.1 °C)  Max: 97.6 °F (36.4 °C)    NAD  RRR, Nl s1s2  Lungs CTA Bilaterally, normal effort  Abdomen soft, ND, NT, no HSM, Bowel sounds normal. PEG in epigastrium  Alert but nonverbal    Data    Data Review:    Recent Labs     02/15/22  0819   WBC 5.3   HGB 13.2   HCT 40.1   MCV 94.2        Recent Labs     02/15/22  0819      K 4.3      CO2 30   BUN 13   CREATININE 0.6     No results for input(s): AST, ALT, ALB, BILIDIR, BILITOT, ALKPHOS in the last 72 hours. No results for input(s): LIPASE, AMYLASE in the last 72 hours. No results for input(s): PROTIME, INR in the last 72 hours. No results for input(s): PTT in the last 72 hours. ASSESSMENT     PEG tube malfunction -PEG tube fell out and was not able to be replaced in the ER at bedside. s/p EGD yesterday with PEG replacement. Esophagitis - mild esophagitis on EGD yesterday. PLAN    - prevacid solutab dissolved in water and given via peg daily    Will sign off. Please call if questions. Discussed with Dr. Souleymane Mejia PA-C  [facility]    I have personally performed a face to face diagnostic evaluation on this patient. I have interviewed and examined the patient and I agree with the findings and recommended plan of care. In summary, my findings and plan are the following:     Tube feeds have not been started yet. PEG site examined and appeared to be tight.   I adjusted the PEG bumper to 4.5 cm. Once tolerating tube feeds, patient may be discharged. Prevacid Solutab dissolved in water and delivered via PEG for esophagitis seen on EGD yesterday. GI will sign off.  Please call if you have questions    Vashti Caceres MD, MSc  [facility]  02/16/22

## 2022-02-16 NOTE — H&P
[facility]                     [facility], [address]                              HISTORY AND PHYSICAL    PATIENT NAME: Shante Segundo                    :        1935  MED REC NO:   [id number]                          ROOM:       5  ACCOUNT NO:   [de-identified]                           ADMIT DATE: 02/15/2022  PROVIDER:     Rene Saleem MD    HISTORY OF PRESENT ILLNESS:  The patient is an 68-year-old black  American lady with late-effect stroke, who has had a gastrostomy tube  placement, came in with a dislodging of gastrostomy tube. As such, the  patient is totally nonverbal, unable to give any history. There is no  obvious pain, no moaning, no groaning. The patient is hemiplegic with  poor responsiveness at baseline. There was no obvious drainage. No  fever, no chills. PAST MEDICAL HISTORY:  Pertinent for late-effect stroke, allergic  rhinitis, advanced dementia, osteoarthritis, bradycardia, cataract,  chronic renal insufficiency, colon polyp, diverticulosis, essential  hypertension, fibrocystic breast disease, hemorrhoids, hypertension,  insomnia, menopause syndrome, osteoarthritis, osteopenia, osteoporosis,  rectal polyp, restless leg syndrome, and shingles. PAST SURGICAL HISTORY:  Pertinent for hysterectomy, colonoscopy. MEDICATIONS:  The patient is on lansoprazole. ALLERGIES:  No known allergies. SOCIAL HISTORY:  She is a . She has three children. She was  always a nonsmoker, always a nondrinker. She used to work as a manager  of [email or web address]. FAMILY HISTORY:  Both the parents are . Mother had cancer. Father had also some kind of cancer and a stroke, history of blindness  and glaucoma. REVIEW OF SYSTEMS:  Unobtainable. The patient is poorly responsive,  aphasic, totally incoherent, not able to communicate. Being fed with  gastrostomy.   She is a nonambulatory person, incontinent to urine and  feces. She is _____ care. PHYSICAL EXAMINATION:  GENERAL:  Poorly-responsive, aphasic, 26-year-old black American lady,  an evident victim of stroke. VITAL SIGNS:  Temperature 97.5, blood pressure 161/94, respirations 16,  heart rate 76. O2 sat 98%. HEENT:  Oral mucosa dry. SKIN:  Skin is warm and dry. NECK:  Neck is supple. Faint carotid bruit. No jugular venous  distention. No lymphadenopathy. No thyromegaly. LUNGS:  Vesicular breath sounds. Poor inspiration. No wheezing. HEART:  Regular rate and rhythm. S1, S2.  ABDOMEN:  Soft. Gastrostomy opening is present. No localized  tenderness. No infection. No pus discharge. EXTREMITIES:  Shows no cyanosis. Trace edema. NEUROLOGIC:  The patient has left-sided hemiplegia. Babinski is  present. LABORATORY DATA:  Lab evaluation shows sodium 143, potassium 4.3,  chloride 105, CO2 of 30, BUN 13, creatinine 0.6, anion gap is 8, GFR is  more than 60. Blood glucose 80. White blood cell count 5.3,  hemoglobin/hematocrit 13.2 and 40.1, platelet count is 722. COVID-19  test is not detected. ASSESSMENT:  Gastrostomy complications, late effect stroke, hemiplegia,  hypertension, chronic renal insufficiency, essential hypertension,  osteopenia, diverticulosis, osteoporosis, bradycardia, peripheral  neuropathy, anxiety. PLAN:  Plan is get her admitted. Observation status. IV hydration. GI  consultation to replace the gastrostomy.         Tona Tinsley MD    D: 02/16/2022 0:34:46       T: 02/16/2022 0:37:34     [email or web address]  Job#: [id number]     Doc#: [id number]    CC:

## 2022-02-16 NOTE — PROGRESS NOTES
Nutrition Note    RECOMMENDATIONS  Nutrition Support:   1. Recommend order \"Diet: Adult Tube Feed\". Initiate Jevity 1.5 (standard with fiber formula) at 25 mL/hr and as tolerated, increase by 25 mL/hr q 4 hours until goal of 50 mL/hr. 2. Recommend 150 mL H20 q 4 hours. Increase flush if Na+ increases greater than 145 mEq/L.  3. Ensure head of bed is 30 - 45 degrees during continuous or bolus gastric feeding and for one hour after bolus. Turn off the feeding if head of bed is lowered less than 30 degrees. 4. Monitor for tolerance (bowel habits, N/V, cramping, abdominal exam findings: distended, firm, tense, guarded, discomfort). 5. Staff to obtain current weight to better assess estimated energy needs. NUTRITION ASSESSMENT   Pt admitted for dislodged feeding tube. Pt from a NH & received Jevity 1.5 @ 50 ml/hr. This provided 1200 ml; 1800 kcal, 77 g pro & 912 ml free water & additional 250 ml water flush q 4 hr. PEG tube replaced on 2/15 with orders for RD to order & manage TF. Will monitor tolerance.  Nutrition Related Findings: LBM 2/15; nonpitting edema RUE   Wounds: None   Nutrition Education:  Education not indicated    Nutrition Goals: TF tolerance @ goal     MALNUTRITION ASSESSMENT       Malnutrition Status: Insufficient data    NUTRITION DIAGNOSIS   Inadequate oral intake related to swallowing difficulty as evidenced by nutrition support - enteral nutrition    CURRENT NUTRITION THERAPIES  Diet NPO Exceptions are: Sips of Water with Meds     PO Intake: NPO   PO Supplement Intake:NPO    · Goal TF & Flush Orders Provides: Jevity 1.5 @ 50 ml/hr provides 1200 ml; 1800 kcal, 77g pro, & 912 ml free water.   Additional 150 ml water flush q 4 hr for fluid needs      ANTHROPOMETRICS   Current Height: 5' 4\" (162.6 cm)   Current    NA (last wt on record: 161 lb (1/27/21)   Ideal Body Weight (IBW): 120 lbs  (55 kg)        COMPARATIVE STANDARDS  Energy (kcal):  1463- 1830     Protein (g):  72-110g Fluid (ml/day):  1 ml/kcal    The patient will be monitored per nutrition standards of care. Consult dietitian if additional nutrition interventions are needed prior to RD reassessment.      Rosario Knutson RD, LD    Contact: [phone]

## 2022-02-16 NOTE — DISCHARGE INSTR - COC
Continuity of Care Form    Patient Name: Alyx Najera   :  1935  MRN:  [id number]    Admit date:  2/15/2022  Discharge date:  2022    Code Status Order: [facility]   Advance Directives:   [place] Directive Type of Healthcare Directive Copy in [address] Agent's Name Healthcare Agent's Phone Number    02/15/22 1246 Yes, patient has an advance directive for healthcare treatment Durable power of  for health care -- -- Crystal D-Daughter On purple communication sheet            Admitting Physician:  Noreen Lakhani MD  PCP: Shaheen Dorado MD    Discharging Nurse: [facility] Unit/Room#: [id number]-[id number]/8231-63  Discharging Unit Phone Number: ***    Emergency Contact:   Extended Emergency Contact Information  Primary Emergency Contact: Giovany Lu, [address] [place] [address] Phone: [phone]  Relation: Child  [facility] [address] Phone: [phone]  Relation: Child    Past Surgical History:  Past Surgical History:   Procedure Laterality Date    COLONOSCOPY      COLONOSCOPY  May 5, 2015    Dr. Luis Malagon    COLONOSCOPY  01/10/2017    Dr. Devyn Schwarz       Immunization History:   Immunization History   Administered Date(s) Administered    Influenza 2013    Influenza, High Dose (Fluzone 65 yrs and older) 2011, 2012, 2014, 10/08/2015, 10/27/2016    Pneumococcal Conjugate 13-valent (Felicitas Balsam) 10/08/2015    Pneumococcal Polysaccharide (Ettuywzeb45) 06/15/2001       Active Problems:  Patient Active Problem List   Diagnosis Code    Restless legs syndrome (RLS) G25.81    Menopause syndrome N95.1    Chronic kidney disease, stage III (moderate) (HCC) N18.30    Allergic rhinitis J30.9    Hemorrhoids K64.9    Osteopenia M85.80    Cataract H26.9    Rectal polyp K62.1    Diverticulosis K57.90    Fibrocystic breast disease N60.19    Osteoporosis M81.0 Bradycardia R00.1    Shingles B02.9    Insomnia G47.00    Peripheral neuropathy G62.9    Anxiety F41.9    Heme positive stool R19.5    Colon polyps K63.5    Essential hypertension I10    Primary osteoarthritis M19.91    Gastrostomy malfunction (HCC) K94.23    Gastrostomy complication (HCC) T11.65       Isolation/Infection:   Isolation            No Isolation          Patient Infection Status       None to display            Nurse Assessment:  Last Vital Signs: /74   Pulse 79   Temp 97.4 °F (36.3 °C) (Axillary)   Resp 18   SpO2 95%     Last documented pain score (0-10 scale): Pain Level: 0  Last Weight:   Wt Readings from Last 1 Encounters:   09/02/19 167 lb (75.8 kg)     Mental Status:  opens eyes to voice     IV Access:  N/a    Nursing Mobility/ADLs:  Walking   turn  Transfer  X 2 assist  Bathing  dependent  Dressing  dependent  Toileting  dependent  Feeding  Dependent / gtube  Med Admin  dependent  Med Delivery   gtube    Wound Care Documentation and Therapy:        Elimination:  Continence: Bowel:incontUrinary Catheter: none incontinent  Colostomy/Ileostomy/Ileal Conduit: none       Date of Last BM: unknown due to cognition    Intake/Output Summary (Last 24 hours) at 2/16/2022 1044  Last data filed at 2/15/2022 1339  Gross per 24 hour   Intake 500 ml   Output --   Net 500 ml     I/O last 3 completed shifts:   In: 500 [I.V.:500]  Out: -     Safety Concerns:     Fall risk    Impairments/Disabilities:      Nonverbal     Nutrition Therapy:  Current Nutrition Therapy:   Worthy Sandy / Steve Rashidy replacement 02/15/2022    Routes of Feeding: tube feeding at facility  Liquids: npo  Daily Fluid Restriction: none  Last Modified Barium Swallow with Video (Video Swallowing Test): none    Treatments at the Time of Hospital Discharge:   Respiratory Treatments: none  Oxygen Therapy:  n/a  Ventilator:    N/a    Rehab Therapies: n/a  Weight Bearing Status/Restrictions: bedrest turn   Other Medical Equipment (for information only, NOT a DME order):  turn  Other Treatments:     Patient's personal belongings (please select all that are sent with patient):  Personal items    RN SIGNATURE:  marcelle tenorio    CASE MANAGEMENT/SOCIAL WORK SECTION    Inpatient Status Date: 02/15/2022    Readmission Risk Assessment Score:  Readmission Risk              Risk of Unplanned Readmission:  0           Discharging to Facility/ Agency   Name: Marilyn Montiel  Address: [address] [place] [address], [address] [place] [address] [place]  Phone: [phone]  Fax: [phone]    / signature: Electronically signed by Shaggy Clark RN on 2/16/22 at 11:36 AM EST    PHYSICIAN SECTION    Prognosis: Poor    Condition at Discharge: Stable    Rehab Potential (if transferring to Rehab): Poor    Recommended Labs or Other Treatments After Discharge: ct tube feeding consider hospice , the level of care she is receiving is of no yield to her     Physician Certification: I certify the above information and transfer of Helga Vivar  is necessary for the continuing treatment of the diagnosis listed and that she requires Palliative Care for greater 30 days.      Update Admission H&P: No change in H&P    PHYSICIAN SIGNATURE:  Electronically signed by Janet Johnson MD on 2/16/22 at 11:46 AM EST

## 2022-02-16 NOTE — PLAN OF CARE
Problem: Falls - Risk of:  Goal: Will remain free from falls  Description: Will remain free from falls  2/16/2022 0828 by Pallavi Herrera RN  Outcome: Ongoing  2/16/2022 0111 by Berkley Astorga RN  Outcome: Ongoing  Goal: Absence of physical injury  Description: Absence of physical injury  2/16/2022 0828 by Pallavi Herrera RN  Outcome: Ongoing  2/16/2022 0111 by Berkley Astorga RN  Outcome: Ongoing     Problem: Skin Integrity:  Goal: Will show no infection signs and symptoms  Description: Will show no infection signs and symptoms  2/16/2022 0828 by Pallavi Herrera RN  Outcome: Ongoing  2/16/2022 0111 by Berkley Astorga RN  Outcome: Ongoing  Goal: Absence of new skin breakdown  Description: Absence of new skin breakdown  2/16/2022 0828 by Pallavi Herrera RN  Outcome: Ongoing  2/16/2022 0111 by Berkley Astorga RN  Outcome: Ongoing

## 2022-02-16 NOTE — PROGRESS NOTES
Patient Active Problem List   Diagnosis    Restless legs syndrome (RLS)    Menopause syndrome    Chronic kidney disease, stage III (moderate) (HCC)    Allergic rhinitis    Hemorrhoids    Osteopenia    Cataract    Rectal polyp    Diverticulosis    Fibrocystic breast disease    Osteoporosis    Bradycardia    Shingles    Insomnia    Peripheral neuropathy    Anxiety    Heme positive stool    Colon polyps    Essential hypertension    Primary osteoarthritis    Gastrostomy malfunction (HCC)    Gastrostomy complication (HCC)   H&P dictated

## 2022-02-16 NOTE — PROGRESS NOTES
Patient discharged with transport to facility per stretcher. gtube intact with binder in place . Belongings/packet sent with patient at discharge.

## 2022-02-16 NOTE — CARE COORDINATION
Discharge Planning Note:      The patient is a current resident of [facility]. The current discharge plan is for the patient to return upon discharge. Will continue to follow.     DALLAS Song Cha RN      Phone: [phone]

## 2022-03-24 NOTE — DISCHARGE SUMMARY
[facility]                     [facility], [address]                               DISCHARGE SUMMARY    PATIENT NAME: Tammy Abad                    :        1935  MED REC NO:   [id number]                          ROOM:       06  ACCOUNT NO:   [de-identified]                           ADMIT DATE: 02/15/2022  PROVIDER:     Henrry San MD                  DISCHARGE DATE:  2022    FINAL DIAGNOSES:  1. Gastrostomy complications. 2.  Late effect stroke. 3.  Hemiplegia. 4.  Hypertension. 5.  Chronic renal insufficiency. 6.  Essential hypertension. 7.  Osteopenia. 8.  Diverticulosis. 9.  Osteoporosis. 10.  Bradycardia. 11.  Peripheral neuropathy. 12.  Anxiety neurosis. DISCHARGE MEDICATIONS:  1. Claritin 10 mg once a day. 2.  Norvasc 5 mg once a day. 3.  Cyanocobalamin 500 mcg once a day. 4.  Multivitamin once a day. 5.  Prevacid 30 mg daily. 6.  Colace 100 mg daily. 7.  Keppra 10 mg/kg two times a day per G-tube. 8.  Milk of magnesia 30 mL at bedtime p.r.n.  9.  Zofran orally disintegrating tablet 8 mg every 6 hours p.r.n. HOSPITAL COURSE:  This elderly black American lady with late effect  stroke who has a G-tube came with a history of dislodged G-tube. The  patient had stable vital signs. She was treated with IV hydration  observation status, GI consultation for replacing the G-tube. Dr. Julian Giron attended to the patient promptly. Gastrojejunostomy tube  dislodgement was addressed. Gastrostomy tube was replaced without any  complication. Tube feeding was restarted. Social service was involved. The patient was discharged in stable condition to [facility]. All  the necessary prescription arrangement made. The patient left in stable  condition with gastrostomy tube functioning.         Mason Ibrahim MD    D: 2022 22:21:36       T: 2022 13:33:58     [id number]  Job#: [id number]     Doc#: [id number]    CC:

## 2022-03-24 NOTE — PROGRESS NOTES
Patient seen , discharge dictated scripts given , arrangements made , KACI completed .  Discussed with nursing staff  And   If applicable ,  Discussed with  Patient's family , all questions answered and concerns addressed  When applicable

## (undated) DEVICE — GOWN AURORA NONREINF LG: Brand: MEDLINE INDUSTRIES, INC.

## (undated) DEVICE — PROCEDURE KIT ENDOSCP CUST

## (undated) DEVICE — MOUTHPIECE ENDOSCP L CTRL OPN AND SIDE PORTS DISP

## (undated) DEVICE — KIT PEG 20FR STD PUL EN ACCS DEV ENDOVIVE

## (undated) DEVICE — BW-412T DISP COMBO CLEANING BRUSH: Brand: SINGLE USE COMBINATION CLEANING BRUSH

## (undated) DEVICE — SET VLV 3 PC AWS DISPOSABLE GRDIAN SCOPEVALET

## (undated) DEVICE — SOLUTION IV IRRIG WATER 500ML POUR BRL ST 2F7113